# Patient Record
Sex: FEMALE | Race: WHITE | Employment: OTHER | URBAN - NONMETROPOLITAN AREA
[De-identification: names, ages, dates, MRNs, and addresses within clinical notes are randomized per-mention and may not be internally consistent; named-entity substitution may affect disease eponyms.]

---

## 2020-05-11 PROBLEM — K21.9 GASTROESOPHAGEAL REFLUX DISEASE: Status: ACTIVE | Noted: 2020-05-11

## 2020-05-11 PROBLEM — K74.60 CIRRHOSIS OF LIVER WITHOUT ASCITES (HCC): Status: ACTIVE | Noted: 2020-05-11

## 2020-09-08 RX ORDER — GABAPENTIN 400 MG/1
400 CAPSULE ORAL 4 TIMES DAILY
COMMUNITY
End: 2021-03-08 | Stop reason: ALTCHOICE

## 2020-09-08 NOTE — PROGRESS NOTES
Covid screening questionnaire complete and negative for symptoms or exposure see chart for documentation.   Please limit your exposure to the public after you have your covid test  Please call your doctor immediately if you develop any symptoms of covid prior to your surgery    Tested negative per [facility]

## 2020-09-08 NOTE — PROGRESS NOTES
PAT call attempted, patient unavailable, left message to please call us back at your earliest convenience; [phone]

## 2020-09-08 NOTE — PROGRESS NOTES
Called and spoke with Patricia Fraser in Medical Records at [facility] and she will be sending the EKG, Chest xray and labs. Will also send the H &P and consent. She will contact [facility] office regarding if clearance done and if so she will fax.

## 2020-09-09 ENCOUNTER — HOSPITAL ENCOUNTER (OUTPATIENT)
Age: 75
Setting detail: SURGERY ADMIT
Discharge: HOME | End: 2020-09-09
Attending: ORTHOPAEDIC SURGERY | Admitting: ORTHOPAEDIC SURGERY
Payer: MEDICARE

## 2020-09-09 VITALS
HEART RATE: 67 BPM | WEIGHT: 183.36 LBS | HEIGHT: 64 IN | TEMPERATURE: 97.4 F | RESPIRATION RATE: 18 BRPM | DIASTOLIC BLOOD PRESSURE: 66 MMHG | SYSTOLIC BLOOD PRESSURE: 141 MMHG | OXYGEN SATURATION: 97 % | BODY MASS INDEX: 31.3 KG/M2

## 2020-09-09 LAB
ABO: NORMAL
ANTIBODY SCREEN: NORMAL
PLATELET # BLD: 39 THOU/MM3 (ref 130–400)
POTASSIUM SERPL-SCNC: 3.2 MEQ/L (ref 3.5–5.2)
RH FACTOR: NORMAL

## 2020-09-09 PROCEDURE — 86900 BLOOD TYPING SEROLOGIC ABO: CPT

## 2020-09-09 PROCEDURE — 86901 BLOOD TYPING SEROLOGIC RH(D): CPT

## 2020-09-09 PROCEDURE — 86850 RBC ANTIBODY SCREEN: CPT

## 2020-09-09 PROCEDURE — 86923 COMPATIBILITY TEST ELECTRIC: CPT

## 2020-09-09 PROCEDURE — 36415 COLL VENOUS BLD VENIPUNCTURE: CPT

## 2020-09-09 PROCEDURE — 2580000003 HC RX 258: Performed by: ORTHOPAEDIC SURGERY

## 2020-09-09 RX ORDER — POLYETHYLENE GLYCOL 3350 17 G/17G
17 POWDER, FOR SOLUTION ORAL DAILY PRN
COMMUNITY
End: 2021-03-08 | Stop reason: ALTCHOICE

## 2020-09-09 RX ORDER — SODIUM CHLORIDE 9 MG/ML
INJECTION, SOLUTION INTRAVENOUS CONTINUOUS
Status: DISCONTINUED | OUTPATIENT
Start: 2020-09-09 | End: 2020-09-09 | Stop reason: HOSPADM

## 2020-09-09 RX ORDER — SODIUM CHLORIDE 0.9 % (FLUSH) 0.9 %
10 SYRINGE (ML) INJECTION PRN
Status: DISCONTINUED | OUTPATIENT
Start: 2020-09-09 | End: 2020-09-09 | Stop reason: HOSPADM

## 2020-09-09 RX ORDER — SODIUM CHLORIDE 0.9 % (FLUSH) 0.9 %
10 SYRINGE (ML) INJECTION EVERY 12 HOURS SCHEDULED
Status: DISCONTINUED | OUTPATIENT
Start: 2020-09-09 | End: 2020-09-09 | Stop reason: HOSPADM

## 2020-09-09 RX ADMIN — SODIUM CHLORIDE: 9 INJECTION, SOLUTION INTRAVENOUS at 12:59

## 2020-09-09 ASSESSMENT — PAIN - FUNCTIONAL ASSESSMENT: PAIN_FUNCTIONAL_ASSESSMENT: 0-10

## 2020-09-09 ASSESSMENT — PAIN DESCRIPTION - DESCRIPTORS: DESCRIPTORS: CONSTANT

## 2020-09-09 NOTE — PROGRESS NOTES
The patient will call Dr Carline Coyle to discuss low platelet count. Dr Lorenzo Nieves spoke to the patient as well. Surgery canceled. Patient taken off unit by w/c to car with belongings and spouse present.

## 2020-09-09 NOTE — H&P
[address] [place] [address]                       PREOPERATIVE HISTORY AND PHYSICAL    PATIENT NAME: Tiffanie Ayoub                      :        1945  MED REC NO:   [id number]                           ROOM:  ACCOUNT NO:   [de-identified]                           ADMIT DATE: 2020  PROVIDER:     Pieter Alcala PA-C    She is a patient of Dr. Burgess Singh who will be undergoing surgery  tomorrow, date of 2020, at [facility] which will  include an L3 to S1 lumbar laminectomy. CHIEF COMPLAINT:  Low back pain and left lower extremity pain. HISTORY OF PRESENT ILLNESS:  The patient is 70years of age who  presented to our office on the date of 2020 with complaints of  severe low back pain and left lower extremity pain beginning on  2020. These symptoms began rather suddenly upon awakening. Her  symptoms of pain include radiation of burning and aching pain into the  anterior thigh and into the anterior shin and central to the toes with  sharp stabbing pain in these regions. Her pain is 9/10 on the VAS scale  and has not improved since the onset. It has limited her quite a bit  with her ability to stand and walk. There have been no right hand side  symptoms. She has been taking Percocet without relief of her pain  overall, and she has been using heat, ice, and chiropractic adjustments  without significant relief as well. DRUG ALLERGIES:  To PENICILLIN, PROPOXYPHENE, and LISINOPRIL. CURRENT MEDICATIONS:  Include carvedilol, hydrochlorothiazide,  potassium, gabapentin, and oxycodone. PAST MEDICAL HISTORY:  Significant for hypertension and cirrhosis of the  liver with low platelet count. PAST SURGICAL HISTORY:  Unavailable. PAST SOCIAL HISTORY:  Denies smoking history. She currently lives at  home with her .     REVIEW OF SYSTEMS:  GENERAL:  Denies fever, fatigue, or chills. RESPIRATORY:  Denies shortness of breath, productive cough, or wheezing. CARDIOVASCULAR:  Denies chest pain, palpitations, or leg swelling. GASTROINTESTINAL:  Denies nausea, vomiting, diarrhea, or abdominal pain. GENITOURINARY:  Denies dysuria or bladder incontinence. NEUROLOGIC:  Denies seizures, blackout, fainting, or headaches. MUSCULOSKELETAL:  Significant for back pain, leg pain, joint pain, and  muscle pain. PHYSICAL EXAMINATION:  VITAL SIGNS:  Most recent vital signs show blood pressure 138/70, pulse  70, temperature 98, respirations 16, weight 183, height 5 feet 4 inches,  BMI 31.41. GENERAL:  The patient is pleasant, cooperative, awake, and oriented x3. She is seated in a chair and does appear to be in distress with pain and  rubbing her right thigh quite frequently. EXTREMITIES:  Bilateral lower extremity exam shows decreased strength on  the left hand side with knee extensor and _____ flexor strength  maintained 4/5. These are 5/5 on the right hand side. Sensation is  fully intact throughout the lower extremities with the exception of a  tingling sensation across the left side shin to palpation. Pulses are  +2 in the posterior tibial artery bilateral.  There is no evidence of  DVT. She does require a walker due to antalgic gait. IMAGING:  Lumbar MRI scan from [facility] shows  displacement of the left L3 nerve root caused by disk extrusion causing  a moderate neurocompression of the left L3 and L4 nerve roots. She is  also stenotic at levels of L4-L5 and L5-S1. The stenosis is severe in  the L5-S1 foramina bilateral.    ASSESSMENT:  1. Spinal stenosis, lumbar region. 2.  Radiculopathy, lumbar region. 3.  Disk degeneration. PLAN:  Planned operation will include an L3 to S1 lumbar laminectomy.     CONCLUSION:  This is a patient who has been dealing with very severe leg  radiculopathy to the left lower extremity due to a large disk herniation  at the L3-L4 level.  She is in need of further surgical management. Preoperative testing did demonstrate a low platelet count of 47 due to  cirrhosis of the liver. We will have her arrive to the hospital several  hours prior to the surgery with a plan to consult Hematology/Oncology  for preparation of surgery. We have received informed consent from the  patient. We have gone over the risks and benefits of surgery which  include postoperative pain, nerve root injury, spinal fluid leak, wound  infection, and blood loss requiring transfusion. We have answered the  patient's questions and concerns to her satisfaction. Absolutely, no  guarantees have been made for the results of the surgery.         Tonia Sutton [place]    D: 09/08/2020 16:24:51       T: 09/08/2020 17:08:32     [email or web address]  Job#: [id number]     Doc#: [id number]

## 2020-09-09 NOTE — FLOWSHEET NOTE
[address] [facility] Dr Mazin Senior for Oncology consult, he recommended Dr Nava Sabillon cancel surgery for today and will see pt in his office. Dr Nava Sabillon notified.

## 2020-09-09 NOTE — PROGRESS NOTES
ADMITTED TO [facility] AND ORIENTED TO UNIT. SCDS ON. FALL AND ALLERGY BANDS ON. PT VERBALIZED APPROVAL FOR FIRST NAME, LAST INITIAL AND PHYSICIAN NAME ON UNIT WHITEBOARD. Spouse, Maritzabessie Leo with the patient.

## 2020-09-30 ENCOUNTER — HOSPITAL ENCOUNTER (OUTPATIENT)
Dept: INFUSION THERAPY | Age: 75
Discharge: HOME | End: 2020-09-30
Payer: MEDICARE

## 2020-09-30 VITALS
HEIGHT: 64 IN | OXYGEN SATURATION: 98 % | WEIGHT: 183 LBS | HEART RATE: 75 BPM | BODY MASS INDEX: 31.24 KG/M2 | SYSTOLIC BLOOD PRESSURE: 114 MMHG | RESPIRATION RATE: 16 BRPM | TEMPERATURE: 98.6 F | DIASTOLIC BLOOD PRESSURE: 56 MMHG

## 2020-09-30 LAB — PLATELET # BLD: 47 THOU/MM3 (ref 130–400)

## 2020-09-30 PROCEDURE — P9035 PLATELET PHERES LEUKOREDUCED: HCPCS

## 2020-09-30 PROCEDURE — 36415 COLL VENOUS BLD VENIPUNCTURE: CPT

## 2020-09-30 PROCEDURE — 2580000003 HC RX 258: Performed by: SPECIALIST

## 2020-09-30 PROCEDURE — 99211 OFF/OP EST MAY X REQ PHY/QHP: CPT

## 2020-09-30 PROCEDURE — 36430 TRANSFUSION BLD/BLD COMPNT: CPT

## 2020-09-30 PROCEDURE — 85049 AUTOMATED PLATELET COUNT: CPT

## 2020-09-30 RX ORDER — 0.9 % SODIUM CHLORIDE 0.9 %
20 INTRAVENOUS SOLUTION INTRAVENOUS ONCE
Status: COMPLETED | OUTPATIENT
Start: 2020-09-30 | End: 2020-09-30

## 2020-09-30 RX ADMIN — SODIUM CHLORIDE 250 ML: 9 INJECTION, SOLUTION INTRAVENOUS at 11:15

## 2020-09-30 NOTE — PROGRESS NOTES
Patient assessed for the following post platelet transfusion:    Dizziness   No  Lightheadedness  No     Acute nausea/vomiting No  Headache   No  Chest pain/pressure  No  Rash/itching   No  Shortness of breath  No    Patient kept for 20 minutes observation post infusion platelets. Patient tolerated 1-pack platelet transfusion without any complications. Last vital signs:   BP (!) 114/56   Pulse 75   Temp 98.6 °F (37 °C) (Oral)   Resp 16 Comment: lungs clear  Ht 5' 4\" (1.626 m)   Wt 183 lb (83 kg)   SpO2 98%   BMI 31.41 kg/m²     Attending physician notified of post platelet count, orders received: Yes    Patient instructed if experience any of the above symptoms following today's infusion,he/she is to notify MD immediately or go to the emergency department. Discharge instructions given to patient. Verbalizes understanding. Ambulated off unit per self wtth spouse with belongings.

## 2020-09-30 NOTE — PLAN OF CARE
Problem: Falls - Risk of:  Goal: Will remain free from falls  Description: Will remain free from falls  Outcome: Met This Shift  Note: Free from falls while in O.P. Oncology. Goal: Absence of physical injury  Description: Absence of physical injury  Outcome: Met This Shift  Intervention: Assess risk factors for falls  Note: Discussed the need to use the call light for assistance when getting up to ambulate. Call light within reach. Problem: Intellectual/Education/Knowledge Deficit  Goal: Teaching initiated upon admission  Outcome: Met This Shift  Note: Patient verbalizes understanding to verbal information given on platelet transfusion,action and possible side effects. Aware to call MD if develop complications. Goal: Written Disposition Instruction form completed  Outcome: Met This Shift  Intervention: Verbal/written education provided  Note: Patient educated blood product transfusion protocol:    Patient receiving 1-pack platelets:      - Blood product transfusion information sheet given: questions answered and consent signed  - Take vital signs/ monitor lungs sound prior to transfusion  - Monitor patient for 15 minutes after transfusion started  - Take vital signs / monitor lungs sound in 15 minutes and post transfusion  - Assess IV site   - Monitor patient closely for potential transfusion reaction    Call MD if develop complications once discharged. Problem: Discharge Planning  Goal: Knowledge of discharge instructions  Description: Knowledge of discharge instructions  Outcome: Met This Shift  Note: Verbalize understanding of discharge instructions, follow up appointments, and when to call Physician. Intervention: Interaction with patient/family and care team  Note: Provide discharge instructions. Care plan reviewed with patient and spouse. Patient and spouse verbalize understanding of the plan of care and contribute to goal setting.

## (undated) DEVICE — DRAPE,INSTRUMENT,MAGNETIC,10X16: Brand: MEDLINE

## (undated) DEVICE — CODMAN® SURGICAL PATTIES 1/2" X 1/2" (1.27CM X 1.27CM): Brand: CODMAN®

## (undated) DEVICE — SPONGE LAP W18XL18IN WHT COT 4 PLY FLD STRUNG RADPQ DISP ST

## (undated) DEVICE — BASIC SINGLE BASIN BTC-LF: Brand: MEDLINE INDUSTRIES, INC.

## (undated) DEVICE — JCKSON TBL POSTNER NO HD REST: Brand: MEDLINE INDUSTRIES, INC.

## (undated) DEVICE — BUR RND FLUT AGRSV 5MM

## (undated) DEVICE — 3M™ STERI-STRIP™ COMPOUND BENZOIN TINCTURE 40 BAGS/CARTON 4 CARTONS/CASE C1544: Brand: 3M™ STERI-STRIP™

## (undated) DEVICE — ADHESIVE SKIN CLSR 0.7ML TOP DERMBND ADV

## (undated) DEVICE — PAD,O.R.,TABLE,CONV FOAM,2X20X72: Brand: MEDLINE

## (undated) DEVICE — DRAPE MICSCP W132XL406CM LENS DIA68MM W VARI LENS2 FOR LEICA

## (undated) DEVICE — KAIRISON TUBING SET PNEUMATIC, (3000 MM), STERILE, DISPOSABLE, TO BE USED WITH: FK898R, PACKAGE OF 10 PIECES: Brand: KAIRISON

## (undated) DEVICE — GOWN,AURORA,NON-REINFORCED,2XL: Brand: MEDLINE

## (undated) DEVICE — STRIP,CLOSURE,WOUND,MEDI-STRIP,1/2X4: Brand: MEDLINE

## (undated) DEVICE — GOWN,SIRUS,NONRNF,SETINSLV,XL,20/CS: Brand: MEDLINE

## (undated) DEVICE — GOWN,SIRUS,NON REINFRCD,LARGE,SET IN SL: Brand: MEDLINE

## (undated) DEVICE — Device